# Patient Record
Sex: MALE | Race: WHITE | Employment: FULL TIME | URBAN - METROPOLITAN AREA
[De-identification: names, ages, dates, MRNs, and addresses within clinical notes are randomized per-mention and may not be internally consistent; named-entity substitution may affect disease eponyms.]

---

## 2022-09-19 ENCOUNTER — HOSPITAL ENCOUNTER (EMERGENCY)
Age: 22
Discharge: HOME | End: 2022-09-19
Attending: EMERGENCY MEDICINE
Payer: COMMERCIAL

## 2022-09-19 VITALS
BODY MASS INDEX: 32.21 KG/M2 | HEIGHT: 70 IN | TEMPERATURE: 98.5 F | HEART RATE: 82 BPM | RESPIRATION RATE: 16 BRPM | WEIGHT: 225 LBS | SYSTOLIC BLOOD PRESSURE: 141 MMHG | DIASTOLIC BLOOD PRESSURE: 94 MMHG | OXYGEN SATURATION: 99 %

## 2022-09-19 DIAGNOSIS — S61.002A AVULSION OF SKIN OF THUMB, LEFT, INITIAL ENCOUNTER: Primary | ICD-10-CM

## 2022-09-19 PROCEDURE — 6370000000 HC RX 637 (ALT 250 FOR IP): Performed by: EMERGENCY MEDICINE

## 2022-09-19 PROCEDURE — 99283 EMERGENCY DEPT VISIT LOW MDM: CPT

## 2022-09-19 RX ADMIN — GELATIN ABSORBABLE SPONGE 12-7 MM 1 EACH: 12-7 MISC at 20:32

## 2022-09-19 ASSESSMENT — PAIN - FUNCTIONAL ASSESSMENT
PAIN_FUNCTIONAL_ASSESSMENT: 0-10
PAIN_FUNCTIONAL_ASSESSMENT: ACTIVITIES ARE NOT PREVENTED

## 2022-09-19 ASSESSMENT — PAIN DESCRIPTION - DESCRIPTORS: DESCRIPTORS: SHARP

## 2022-09-19 ASSESSMENT — PAIN DESCRIPTION - LOCATION: LOCATION: HAND;OTHER (COMMENT)

## 2022-09-19 ASSESSMENT — PAIN DESCRIPTION - PAIN TYPE: TYPE: ACUTE PAIN

## 2022-09-19 ASSESSMENT — PAIN DESCRIPTION - ONSET: ONSET: SUDDEN

## 2022-09-19 ASSESSMENT — PAIN SCALES - GENERAL: PAINLEVEL_OUTOF10: 4

## 2022-09-19 ASSESSMENT — PAIN DESCRIPTION - FREQUENCY: FREQUENCY: CONTINUOUS

## 2022-09-19 ASSESSMENT — PAIN DESCRIPTION - ORIENTATION: ORIENTATION: LEFT

## 2022-09-19 NOTE — ED TRIAGE NOTES
Pt presented to ED with laceration to tip of left thumb with knife. Bleeding controlled at this time.

## 2022-09-20 NOTE — ED NOTES
Patient discharged to home via family. Written discharge instructions reviewed with understanding. Copy of AVS sent home with patient. Patient able to walk from ED without assistance.         Aristeo Anthony RN  09/19/22 [id number]

## 2022-09-20 NOTE — DISCHARGE INSTRUCTIONS
Change dressing once daily. May need to wet dressing material prior to pulling off.   Apply antibiotic ointment for the next 4-5 days then a dry dressing thereafter
detailed exam

## 2022-09-20 NOTE — ED PROVIDER NOTES
[id number] [facility]          ATTENDING PHYSICIAN NOTE       Date of evaluation: 9/19/2022    Chief Complaint     Laceration (Tip of left thumb. Bleeding controlled at this time)      History of Present Illness     Jc Samuels is a 23 y.o. male who presents with an avulsion type laceration to his left thumb that resulted from a knife. There was a small piece of skin that was completely avulsed and it resulted in bleeding that he had a hard time stopping. Eventually when he could not get it to stop he came to the hospital.  He denies any other injuries. Tetanus up-to-date. Review of Systems     Paresthesias are not reported. The patient denies any other injuries. See HPI for further details. Review of systems otherwise negative. Past Medical, Surgical, Family, and Social History     He has a past medical history of Seizures ([facility] [address] He has no past surgical history on file. His family history includes High Cholesterol in his mother; Thyroid Disease in his father. He reports that he has never smoked. He has never used smokeless tobacco. He reports that he does not drink alcohol and does not use drugs. Medications     Current Discharge Medication List        CONTINUE these medications which have NOT CHANGED    Details   Cetirizine HCl 10 MG CAPS Take by mouth      Omega-3 Fatty Acids (FISH OIL PO) Take by mouth      OXcarbazepine (TRILEPTAL) 150 MG tablet Take 150 mg by mouth 2 times daily. Melatonin 3 MG TABS Take 5 mg by mouth daily. Spacer/Aero-Holding Chambers (VALVED HOLDING CHAMBER) THIERRY       loratadine (CLARITIN) 10 MG tablet Take 10 mg by mouth daily. Allergies     He has No Known Allergies.     Physical Exam     INITIAL VITALS: BP: (!) 141/94, Temp: 98.5 °F (36.9 °C), Heart Rate: 82, Resp: 16, SpO2: 99 %   Constitutional:  Well developed, well nourished, no acute distress, non-toxic appearance   Musculoskeletal:  mild tenderness at site  Neurologic:  Distal neurovascular exam is intact, no loss of sensation. Pain present with palpation  Integument: 7 mm x 7 mm avulsion laceration with very small amount of very superficial missing skin. No active bleeding at this time on my examination. Diagnostic Results     RADIOLOGY:  No orders to display         ED Course     Nursing Notes, Past Medical Hx, Past Surgical Hx, Social Hx, Allergies, and Family Hx were reviewed. The patient was given the following medications:  Orders Placed This Encounter   Medications    gelatin adsorbable (GELFOAM) sponge 1 each     Given the nature of this type of injury, there was no skin to repair. The skin was avulsed and bleeding had stopped. The wound was cleaned and a Gelfoam dressing was applied with pressure. He will be advised on wound care and discharged home. CONSULTS:  None    MEDICAL DECISION MAKING / ASSESSMENT / Xochitl Sisi is a 23 y.o. male presenting with an avulsion laceration of the left thumb. Since this was not amenable to any type of sutures, a Gelfoam dressing with pressure was applied and the patient was felt to be stable for discharge home with wound care instructions. Clinical Impression     1.  Avulsion of skin of thumb, left, initial encounter        Disposition     PATIENT REFERRED TO:  Esteban Hilario      As needed      DISCHARGE MEDICATIONS:  Current Discharge Medication List          DISPOSITION DISPOSITION Decision To Discharge 09/19/2022 08:32:28 PM          Yessenia Fuller MD  09/19/22 2056